# Patient Record
Sex: MALE | Race: WHITE
[De-identification: names, ages, dates, MRNs, and addresses within clinical notes are randomized per-mention and may not be internally consistent; named-entity substitution may affect disease eponyms.]

---

## 2018-02-10 ENCOUNTER — HOSPITAL ENCOUNTER (EMERGENCY)
Dept: HOSPITAL 35 - ER | Age: 68
Discharge: HOME | End: 2018-02-10
Payer: COMMERCIAL

## 2018-02-10 VITALS — HEIGHT: 68 IN | WEIGHT: 220 LBS | BODY MASS INDEX: 33.34 KG/M2

## 2018-02-10 DIAGNOSIS — Z87.891: ICD-10-CM

## 2018-02-10 DIAGNOSIS — N20.1: Primary | ICD-10-CM

## 2018-02-10 LAB
ALBUMIN SERPL-MCNC: 3.8 G/DL (ref 3.4–5)
ALT SERPL-CCNC: 42 U/L (ref 30–65)
ANION GAP SERPL CALC-SCNC: 13 MMOL/L (ref 7–16)
AST SERPL-CCNC: 22 U/L (ref 15–37)
BASOPHILS NFR BLD AUTO: 1.5 % (ref 0–2)
BILIRUB DIRECT SERPL-MCNC: 0.1 MG/DL
BILIRUB SERPL-MCNC: 0.7 MG/DL
BILIRUB UR-MCNC: NEGATIVE MG/DL
BUN SERPL-MCNC: 21 MG/DL (ref 7–18)
CALCIUM SERPL-MCNC: 9.5 MG/DL (ref 8.5–10.1)
CHLORIDE SERPL-SCNC: 102 MMOL/L (ref 98–107)
CO2 SERPL-SCNC: 24 MMOL/L (ref 21–32)
COLOR UR: YELLOW
CREAT SERPL-MCNC: 1.3 MG/DL (ref 0.7–1.3)
EOSINOPHIL NFR BLD: 5.3 % (ref 0–3)
ERYTHROCYTE [DISTWIDTH] IN BLOOD BY AUTOMATED COUNT: 13.5 % (ref 10.5–14.5)
GLUCOSE SERPL-MCNC: 160 MG/DL (ref 74–106)
GRANULOCYTES NFR BLD MANUAL: 56.5 % (ref 36–66)
HCT VFR BLD CALC: 46.7 % (ref 42–52)
HGB BLD-MCNC: 16.1 GM/DL (ref 14–18)
KETONES UR STRIP-MCNC: (no result) MG/DL
LIPASE: 111 U/L (ref 73–393)
LYMPHOCYTES NFR BLD AUTO: 26.1 % (ref 24–44)
MCH RBC QN AUTO: 31.3 PG (ref 26–34)
MCHC RBC AUTO-ENTMCNC: 34.5 G/DL (ref 28–37)
MCV RBC: 90.9 FL (ref 80–100)
MONOCYTES NFR BLD: 10.6 % (ref 1–8)
MUCUS: (no result) STRN/LPF
NEUTROPHILS # BLD: 5.2 THOU/UL (ref 1.4–8.2)
PLATELET # BLD: 265 THOU/UL (ref 150–400)
POTASSIUM SERPL-SCNC: 3.5 MMOL/L (ref 3.5–5.1)
PROT SERPL-MCNC: 7.3 G/DL (ref 6.4–8.2)
RBC # BLD AUTO: 5.14 MIL/UL (ref 4.5–6)
RBC # UR STRIP: (no result) /UL
SODIUM SERPL-SCNC: 139 MMOL/L (ref 136–145)
SP GR UR STRIP: >= 1.03 (ref 1–1.03)
URINE CLARITY: CLEAR
URINE GLUCOSE-RANDOM*: NEGATIVE
URINE LEUKOCYTES-REFLEX: NEGATIVE
URINE NITRITE-REFLEX: NEGATIVE
URINE PROTEIN (DIPSTICK): NEGATIVE
URINE WBC-REFLEX: (no result) /HPF (ref 0–5)
UROBILINOGEN UR STRIP-ACNC: 0.2 E.U./DL (ref 0.2–1)
WBC # BLD AUTO: 9.2 THOU/UL (ref 4–11)

## 2018-02-13 ENCOUNTER — HOSPITAL ENCOUNTER (OUTPATIENT)
Dept: HOSPITAL 19 - COL.VAS | Age: 68
End: 2018-02-13
Attending: UROLOGY
Payer: MEDICARE

## 2018-02-13 DIAGNOSIS — M66.0: Primary | ICD-10-CM

## 2021-11-22 ENCOUNTER — HOSPITAL ENCOUNTER (OUTPATIENT)
Dept: HOSPITAL 19 - COL.RAD | Age: 71
End: 2021-11-22
Attending: FAMILY MEDICINE
Payer: MEDICARE

## 2021-11-22 DIAGNOSIS — Z13.6: Primary | ICD-10-CM

## 2022-01-18 ENCOUNTER — HOSPITAL ENCOUNTER (EMERGENCY)
Dept: HOSPITAL 19 - COL.ER | Age: 72
Discharge: HOME | End: 2022-01-18
Attending: EMERGENCY MEDICINE
Payer: MEDICARE

## 2022-01-18 VITALS — HEART RATE: 60 BPM | DIASTOLIC BLOOD PRESSURE: 82 MMHG | SYSTOLIC BLOOD PRESSURE: 126 MMHG

## 2022-01-18 VITALS — HEIGHT: 67.99 IN | BODY MASS INDEX: 33.41 KG/M2 | WEIGHT: 220.46 LBS

## 2022-01-18 VITALS — TEMPERATURE: 98 F

## 2022-01-18 DIAGNOSIS — E11.9: ICD-10-CM
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
## 2022-01-18 DIAGNOSIS — Z23: ICD-10-CM

## 2022-01-18 DIAGNOSIS — Z87.891: ICD-10-CM

## 2022-01-18 DIAGNOSIS — S00.01XA: ICD-10-CM

## 2022-01-18 DIAGNOSIS — S63.283A: Primary | ICD-10-CM

## 2022-08-21 ENCOUNTER — HOSPITAL ENCOUNTER (OUTPATIENT)
Dept: HOSPITAL 19 - COL.ER | Age: 72
Setting detail: OBSERVATION
LOS: 3 days | Discharge: HOME | End: 2022-08-24
Attending: SURGERY | Admitting: SURGERY
Payer: MEDICARE

## 2022-08-21 VITALS — BODY MASS INDEX: 32.24 KG/M2 | HEIGHT: 67.99 IN | WEIGHT: 212.75 LBS

## 2022-08-21 DIAGNOSIS — K85.90: ICD-10-CM

## 2022-08-21 DIAGNOSIS — Z20.822: ICD-10-CM

## 2022-08-21 DIAGNOSIS — K81.1: Primary | ICD-10-CM

## 2022-08-21 DIAGNOSIS — G47.33: ICD-10-CM

## 2022-08-21 DIAGNOSIS — Z87.891: ICD-10-CM

## 2022-08-21 DIAGNOSIS — Z79.899: ICD-10-CM

## 2022-08-21 LAB
BASOPHILS # BLD: 0 K/MM3 (ref 0–0.2)
BASOPHILS NFR BLD AUTO: 0.4 % (ref 0–2)
EOSINOPHIL # BLD: 0 K/MM3 (ref 0–0.7)
EOSINOPHIL NFR BLD: 0.4 % (ref 0–4)
ERYTHROCYTE [DISTWIDTH] IN BLOOD BY AUTOMATED COUNT: 13.2 % (ref 11.5–14.5)
GRANULOCYTES # BLD AUTO: 84.6 % (ref 42.2–75.2)
HCT VFR BLD AUTO: 49 % (ref 42–52)
HGB BLD-MCNC: 16.3 G/DL (ref 13.5–18)
LYMPHOCYTES # BLD: 1 K/MM3 (ref 1.2–3.4)
LYMPHOCYTES NFR BLD: 9.2 % (ref 20–51)
MCH RBC QN AUTO: 31 PG (ref 27–31)
MCHC RBC AUTO-ENTMCNC: 33 G/DL (ref 33–37)
MCV RBC AUTO: 93 FL (ref 80–100)
MONOCYTES # BLD: 0.5 K/MM3 (ref 0.1–0.6)
MONOCYTES NFR BLD AUTO: 4.8 % (ref 1.7–9.3)
NEUTROPHILS # BLD: 9.2 K/MM3 (ref 1.4–6.5)
PLATELET # BLD AUTO: 239 K/MM3 (ref 130–400)
PMV BLD AUTO: 10 FL (ref 7.4–10.4)
RBC # BLD AUTO: 5.27 M/MM3 (ref 4.2–5.6)

## 2022-08-21 PROCEDURE — G0378 HOSPITAL OBSERVATION PER HR: HCPCS

## 2022-08-22 VITALS — DIASTOLIC BLOOD PRESSURE: 82 MMHG | SYSTOLIC BLOOD PRESSURE: 128 MMHG | TEMPERATURE: 97.8 F | HEART RATE: 54 BPM

## 2022-08-22 VITALS — SYSTOLIC BLOOD PRESSURE: 145 MMHG | DIASTOLIC BLOOD PRESSURE: 77 MMHG | TEMPERATURE: 98.2 F | HEART RATE: 58 BPM

## 2022-08-22 VITALS — SYSTOLIC BLOOD PRESSURE: 124 MMHG | TEMPERATURE: 98.5 F | HEART RATE: 53 BPM | DIASTOLIC BLOOD PRESSURE: 72 MMHG

## 2022-08-22 VITALS — DIASTOLIC BLOOD PRESSURE: 62 MMHG | HEART RATE: 51 BPM | SYSTOLIC BLOOD PRESSURE: 97 MMHG | TEMPERATURE: 98.2 F

## 2022-08-22 VITALS — SYSTOLIC BLOOD PRESSURE: 110 MMHG | HEART RATE: 92 BPM | TEMPERATURE: 98 F | DIASTOLIC BLOOD PRESSURE: 68 MMHG

## 2022-08-22 VITALS — HEART RATE: 50 BPM | TEMPERATURE: 97.4 F | SYSTOLIC BLOOD PRESSURE: 97 MMHG | DIASTOLIC BLOOD PRESSURE: 62 MMHG

## 2022-08-22 LAB
ALBUMIN SERPL-MCNC: 3.9 GM/DL (ref 3.4–4.8)
ALP SERPL-CCNC: 100 U/L (ref 40–150)
ALT SERPL-CCNC: 84 U/L (ref 0–55)
ANION GAP SERPL CALC-SCNC: 13 MMOL/L (ref 7–16)
AST SERPL-CCNC: 108 U/L (ref 5–34)
BILIRUB SERPL-MCNC: 0.7 MG/DL (ref 0.2–1.2)
BUN SERPL-MCNC: 19 MG/DL (ref 8–26)
CALCIUM SERPL-MCNC: 10.1 MG/DL (ref 8.4–10.2)
CHLORIDE SERPL-SCNC: 104 MMOL/L (ref 98–107)
CO2 SERPL-SCNC: 22 MMOL/L (ref 23–31)
CREAT SERPL-SCNC: 1.04 MG/DL (ref 0.72–1.25)
CRP SERPL-MCNC: 0.32 MG/DL (ref 0–0.5)
GLUCOSE SERPL-MCNC: 146 MG/DL (ref 70–99)
LIPASE SERPL-CCNC: 2570 U/L (ref 8–78)
PH UR STRIP.AUTO: 5.5 [PH] (ref 5–8.5)
POTASSIUM SERPL-SCNC: 4.1 MMOL/L (ref 3.5–4.5)
PROT SERPL-MCNC: 7.2 GM/DL (ref 6.2–8.1)
RBC # UR STRIP.AUTO: (no result) /UL
RBC # UR: (no result) /HPF (ref 0–2)
SODIUM SERPL-SCNC: 139 MMOL/L (ref 136–145)
SP GR UR STRIP.AUTO: 1.02 (ref 1–1.03)
TROPONIN I SERPL-MCNC: < 0.01 NG/ML (ref 0–0.03)
URN COLLECT METHOD CLASS: (no result)
UROBILINOGEN UR STRIP.AUTO-MCNC: 0.2 E.U/DL (ref 0.2–1)

## 2022-08-22 NOTE — NUR
PT ARRIVED TO THE MEDICAL FLOOR AROUND 0400HRS TO ROOM 357. PT A&O x 4; VSS;
O2 RA. PT COMPLAINED OF UPPER ABD/LOWER CHEST PAIN. PT GIVEN MORPHINE FOR
PAIN. PT FELT MORPHINE WAS EFFECTIVE. PT DENIED PALPITATIONS, SOB, N,V,D OR
DIZZINESS AT THIS TIME. ADMISSIONS ASSESSMENT AND MED REC COMPLETE. PT
ORIENTED TO ROOM AND HOSPITAL POLICY. POC DISCUSSED WITH PT. PT VERBALIZED
UNDERSTANDING. ALL QUESTIONS AND CONCERNS ADDRESSED. PT EXPRESSED NO
ADDITIONAL NEEDS AT THIS TIME. CALL LIGHT WITHIN REACH.

## 2022-08-22 NOTE — NUR
PT RESTING IN BED, ASSESSMENTS COMPLETE, COVID SWAB OBBTAINED. PT DENIES
NEEDS. DR. GO IN TO SEE THIS PT TODAY

## 2022-08-22 NOTE — NUR
met with patient to compelte intake and discuss discharge plan.
Patient reports that he lives at home with his wife Stacy ([phone]) in
[place]. Patient reports that he is independent with his ADL's and does not
utilize any DME to assist with mobility. Patient reports that he does not use
home oxygen but does utilize a CPAP at night that is managed through [facility] PCP is Dr.Chance Ness and [facility]. For short term medications, he
utilizes [facility]. Patient states that he "thinks" he has a S[facility]-HC estbalished
listing his wife as his agent.
 
Discharge plan: Home

## 2022-08-22 NOTE — NUR
Milena:  [facility]
Situation:   went by room on rounds
Background:  Pt was resting and content
Assessment:  Pt has no needs right now
Recommendation:   will follow up as needed

## 2022-08-22 NOTE — NUR
PT IN BED. DENIES ABD PAIN, HAS GOOD BOWEL SOUNDS. IVF TO LT AC, INFUSING
WITHOUT PROBLEM. IS ALERT AND ORIENTED X4. TAKING CLEAR LIQUIDS WITHOUT
PROBLEM.

## 2022-08-23 VITALS — TEMPERATURE: 98.1 F | HEART RATE: 54 BPM | DIASTOLIC BLOOD PRESSURE: 74 MMHG | SYSTOLIC BLOOD PRESSURE: 109 MMHG

## 2022-08-23 VITALS — TEMPERATURE: 99 F | HEART RATE: 60 BPM | DIASTOLIC BLOOD PRESSURE: 82 MMHG | SYSTOLIC BLOOD PRESSURE: 131 MMHG

## 2022-08-23 VITALS — HEART RATE: 63 BPM | TEMPERATURE: 97.8 F | DIASTOLIC BLOOD PRESSURE: 77 MMHG | SYSTOLIC BLOOD PRESSURE: 124 MMHG

## 2022-08-23 VITALS — HEART RATE: 59 BPM | SYSTOLIC BLOOD PRESSURE: 115 MMHG | TEMPERATURE: 98.7 F | DIASTOLIC BLOOD PRESSURE: 75 MMHG

## 2022-08-23 VITALS — DIASTOLIC BLOOD PRESSURE: 61 MMHG | HEART RATE: 62 BPM | SYSTOLIC BLOOD PRESSURE: 108 MMHG | TEMPERATURE: 98.6 F

## 2022-08-23 VITALS — DIASTOLIC BLOOD PRESSURE: 67 MMHG | HEART RATE: 99 BPM | TEMPERATURE: 97.9 F | SYSTOLIC BLOOD PRESSURE: 111 MMHG

## 2022-08-23 LAB
ALBUMIN SERPL-MCNC: 3.7 GM/DL (ref 3.4–4.8)
ALP SERPL-CCNC: 86 U/L (ref 40–150)
ALT SERPL-CCNC: 47 U/L (ref 0–55)
ANION GAP SERPL CALC-SCNC: 11 MMOL/L (ref 7–16)
AST SERPL-CCNC: 24 U/L (ref 5–34)
BASOPHILS # BLD: 0 K/MM3 (ref 0–0.2)
BASOPHILS NFR BLD AUTO: 0.5 % (ref 0–2)
BILIRUB SERPL-MCNC: 0.9 MG/DL (ref 0.2–1.2)
BUN SERPL-MCNC: 11 MG/DL (ref 8–26)
CALCIUM SERPL-MCNC: 9.5 MG/DL (ref 8.4–10.2)
CHLORIDE SERPL-SCNC: 107 MMOL/L (ref 98–107)
CO2 SERPL-SCNC: 25 MMOL/L (ref 23–31)
CREAT SERPL-SCNC: 0.88 MG/DL (ref 0.72–1.25)
EOSINOPHIL # BLD: 0.3 K/MM3 (ref 0–0.7)
EOSINOPHIL NFR BLD: 3.9 % (ref 0–4)
ERYTHROCYTE [DISTWIDTH] IN BLOOD BY AUTOMATED COUNT: 13.4 % (ref 11.5–14.5)
GLUCOSE SERPL-MCNC: 84 MG/DL (ref 70–99)
GRANULOCYTES # BLD AUTO: 69.9 % (ref 42.2–75.2)
HCT VFR BLD AUTO: 48 % (ref 42–52)
HGB BLD-MCNC: 15.8 G/DL (ref 13.5–18)
LIPASE SERPL-CCNC: 51 U/L (ref 8–78)
LYMPHOCYTES # BLD: 1.4 K/MM3 (ref 1.2–3.4)
LYMPHOCYTES NFR BLD: 17.9 % (ref 20–51)
MCH RBC QN AUTO: 31 PG (ref 27–31)
MCHC RBC AUTO-ENTMCNC: 33 G/DL (ref 33–37)
MCV RBC AUTO: 95 FL (ref 80–100)
MONOCYTES # BLD: 0.6 K/MM3 (ref 0.1–0.6)
MONOCYTES NFR BLD AUTO: 7 % (ref 1.7–9.3)
NEUTROPHILS # BLD: 5.6 K/MM3 (ref 1.4–6.5)
PLATELET # BLD AUTO: 220 K/MM3 (ref 130–400)
PMV BLD AUTO: 10.3 FL (ref 7.4–10.4)
POTASSIUM SERPL-SCNC: 4 MMOL/L (ref 3.5–4.5)
PROT SERPL-MCNC: 6.8 GM/DL (ref 6.2–8.1)
RBC # BLD AUTO: 5.08 M/MM3 (ref 4.2–5.6)
SODIUM SERPL-SCNC: 143 MMOL/L (ref 136–145)

## 2022-08-23 NOTE — NUR
PT REPORTS VOIDING Q30 MIN, APPROX 200CC EACH TIME. REQUESTED BATH WIPES AND
CLEAN GOWN, PROVIDED AT THIS TIME.

## 2022-08-23 NOTE — NUR
Initial visit; Patient thanked  for looking in on him and offering
prayer and God's blessings for his upcoming surgical procedure. Patient
appeared to feel better following his prayer.

## 2022-08-23 NOTE — NUR
met with patient to complete intake and discuss discharge plan.
Patient currently lives at home with his wife Stacy ([phone]) in
[place]. Stacy is present at bedside. Patient is independent with his ADL's
and does not utilize any DME to assist with mobility. Patient has no home
oxygen needs. PCP is Dr. Eugenio Ness and they utilizes [facility] for
prescriptions. Patient states he does not have a DPO[facility]-HC established at this
time, and is not interested in creating one at this time. Patient is planning
on returning home once medically ready.
 
Discharge plan: Home

## 2022-08-23 NOTE — NUR
Patient assessed around 1945. Alert and oriented, and able to make needs
known. Denies having pain and discomfort, as well as nausea. Patient has IV
fluids running to left AC per orders. LS CTA. HRR. BSAx4. Did have small BM.
No edema. Patient aware that he is NPO after midnight for procedure tomorrow.
Voices no questions, needs, or concerns at this time. In bed with call light
within reach.

## 2022-08-24 VITALS — DIASTOLIC BLOOD PRESSURE: 62 MMHG | SYSTOLIC BLOOD PRESSURE: 106 MMHG | HEART RATE: 56 BPM

## 2022-08-24 VITALS — DIASTOLIC BLOOD PRESSURE: 68 MMHG | HEART RATE: 67 BPM | SYSTOLIC BLOOD PRESSURE: 108 MMHG

## 2022-08-24 VITALS — TEMPERATURE: 98 F | SYSTOLIC BLOOD PRESSURE: 127 MMHG | DIASTOLIC BLOOD PRESSURE: 79 MMHG | HEART RATE: 57 BPM

## 2022-08-24 VITALS — DIASTOLIC BLOOD PRESSURE: 85 MMHG | SYSTOLIC BLOOD PRESSURE: 132 MMHG | TEMPERATURE: 98.4 F | HEART RATE: 61 BPM

## 2022-08-24 VITALS — HEART RATE: 61 BPM | DIASTOLIC BLOOD PRESSURE: 63 MMHG | SYSTOLIC BLOOD PRESSURE: 112 MMHG

## 2022-08-24 VITALS — SYSTOLIC BLOOD PRESSURE: 101 MMHG | DIASTOLIC BLOOD PRESSURE: 66 MMHG | HEART RATE: 51 BPM

## 2022-08-24 VITALS — TEMPERATURE: 97.4 F

## 2022-08-24 VITALS — SYSTOLIC BLOOD PRESSURE: 103 MMHG | HEART RATE: 65 BPM | DIASTOLIC BLOOD PRESSURE: 69 MMHG

## 2022-08-24 NOTE — NUR
PATIENT ALERT AND ORIENTED X4. VSS. PATIENT HERE FOR CHOLECYSTITIS. PATIENT
SCHEDULED FOR ROBOTIC FRANCISCO. PATIENT DENIES PAIN AT THIS TIME. IV TO LEFT AC
WITH NS RUNNING AT 125ML/HOUR. PATIENT ON ROOM AIR. ASSESSMENT PERFORMED. AM
MEDS ADMINISTERED. PATIENT RESTING IN BED WITH CALL LIGHT NEAR.

## 2022-08-24 NOTE — NUR
DISCHARGE INSTRUCTIONS PROVIDED. PATIENT EDUCATION GIVEN. FOLLOW UP
APPOINTMENTS DISCUSSED. PATIENT DENIES ANY QUESTIONS OR CONCERNS. IV DC'D.
PATEINT ESCORTED OUT VIA WHEELCHAIR.

## 2022-08-24 NOTE — NUR
Patient continues on IV fluids and ABX per orders. Has been NPO since
midnight. Voices no questions, needs, or concerns at this time. In bed with
call light within reach.

## 2022-09-12 ENCOUNTER — HOSPITAL ENCOUNTER (OUTPATIENT)
Dept: HOSPITAL 19 - COL.RAD | Age: 72
End: 2022-09-12
Attending: FAMILY MEDICINE
Payer: MEDICARE

## 2022-09-12 DIAGNOSIS — R11.2: ICD-10-CM

## 2022-09-12 DIAGNOSIS — R10.13: Primary | ICD-10-CM

## 2022-09-12 LAB
ALBUMIN SERPL-MCNC: 4 GM/DL (ref 3.4–4.8)
ALP SERPL-CCNC: 92 U/L (ref 40–150)
ALT SERPL-CCNC: 23 U/L (ref 0–55)
ANION GAP SERPL CALC-SCNC: 12 MMOL/L (ref 7–16)
AST SERPL-CCNC: 17 U/L (ref 5–34)
BASOPHILS # BLD: 0.1 K/MM3 (ref 0–0.2)
BASOPHILS NFR BLD AUTO: 0.6 % (ref 0–2)
BILIRUB SERPL-MCNC: 0.8 MG/DL (ref 0.2–1.2)
BUN SERPL-MCNC: 13 MG/DL (ref 8–26)
CALCIUM SERPL-MCNC: 10.4 MG/DL (ref 8.4–10.2)
CHLORIDE SERPL-SCNC: 102 MMOL/L (ref 98–107)
CO2 SERPL-SCNC: 26 MMOL/L (ref 23–31)
CREAT SERPL-SCNC: 1.27 MG/DL (ref 0.72–1.25)
EOSINOPHIL # BLD: 0.3 K/MM3 (ref 0–0.7)
EOSINOPHIL NFR BLD: 3.8 % (ref 0–4)
ERYTHROCYTE [DISTWIDTH] IN BLOOD BY AUTOMATED COUNT: 13.2 % (ref 11.5–14.5)
GLUCOSE SERPL-MCNC: 97 MG/DL (ref 70–99)
GRANULOCYTES # BLD AUTO: 64.5 % (ref 42.2–75.2)
HCT VFR BLD AUTO: 46.8 % (ref 42–52)
HGB BLD-MCNC: 16.1 G/DL (ref 13.5–18)
LIPASE SERPL-CCNC: 15 U/L (ref 8–78)
LYMPHOCYTES # BLD: 1.7 K/MM3 (ref 1.2–3.4)
LYMPHOCYTES NFR BLD: 21 % (ref 20–51)
MCH RBC QN AUTO: 31 PG (ref 27–31)
MCHC RBC AUTO-ENTMCNC: 34 G/DL (ref 33–37)
MCV RBC AUTO: 89 FL (ref 80–100)
MONOCYTES # BLD: 0.8 K/MM3 (ref 0.1–0.6)
MONOCYTES NFR BLD AUTO: 9.5 % (ref 1.7–9.3)
NEUTROPHILS # BLD: 5.1 K/MM3 (ref 1.4–6.5)
PLATELET # BLD AUTO: 314 K/MM3 (ref 130–400)
PMV BLD AUTO: 9.7 FL (ref 7.4–10.4)
POTASSIUM SERPL-SCNC: 3.2 MMOL/L (ref 3.5–4.5)
PROT SERPL-MCNC: 7.3 GM/DL (ref 6.2–8.1)
RBC # BLD AUTO: 5.24 M/MM3 (ref 4.2–5.6)
SODIUM SERPL-SCNC: 140 MMOL/L (ref 136–145)